# Patient Record
Sex: FEMALE | Race: WHITE | NOT HISPANIC OR LATINO | Employment: FULL TIME | URBAN - METROPOLITAN AREA
[De-identification: names, ages, dates, MRNs, and addresses within clinical notes are randomized per-mention and may not be internally consistent; named-entity substitution may affect disease eponyms.]

---

## 2018-08-13 ENCOUNTER — TRANSFERRED RECORDS (OUTPATIENT)
Dept: HEALTH INFORMATION MANAGEMENT | Facility: CLINIC | Age: 23
End: 2018-08-13

## 2018-11-26 ENCOUNTER — TELEPHONE (OUTPATIENT)
Dept: PLASTIC SURGERY | Facility: CLINIC | Age: 23
End: 2018-11-26

## 2018-11-26 NOTE — TELEPHONE ENCOUNTER
Attempt to call patient to remind him of his appt on 11/27/18. Phone is not accepting incoming calls. Unable to leave message.    Pj BREAUX LPN

## 2019-03-11 ENCOUNTER — PRE VISIT (OUTPATIENT)
Dept: PLASTIC SURGERY | Facility: CLINIC | Age: 24
End: 2019-03-11

## 2019-03-11 NOTE — TELEPHONE ENCOUNTER
FUTURE VISIT INFORMATION      FUTURE VISIT INFORMATION:    Date: 3/19/19    Time: 5:30pm    Location: [facility] [place]  REFERRAL INFORMATION:    Referring provider:  Dr. Sonia Bravo    Referring providers clinic:  [facility]    Reason for visit/diagnosis mastectomy, referral     RECORDS REQUESTED FROM:       Clinic name Comments Records Status Imaging Status   [facility] received- sent to scanning

## 2019-04-08 PROBLEM — Z79.899 CONTROLLED SUBSTANCE AGREEMENT SIGNED: Status: ACTIVE | Noted: 2017-05-11

## 2019-04-09 ENCOUNTER — OFFICE VISIT (OUTPATIENT)
Dept: PLASTIC SURGERY | Facility: CLINIC | Age: 24
End: 2019-04-09
Payer: COMMERCIAL

## 2019-04-09 VITALS — WEIGHT: 293 LBS | BODY MASS INDEX: 53.92 KG/M2 | HEIGHT: 62 IN

## 2019-04-09 DIAGNOSIS — F64.0 GENDER DYSPHORIA IN ADULT: Primary | ICD-10-CM

## 2019-04-09 ASSESSMENT — MIFFLIN-ST. JEOR: SCORE: 2040.9

## 2019-04-09 NOTE — PROGRESS NOTES
"REFERRING PROVIDER: Sonia Bravo    REASON FOR CONSULTATION: Gender dysphoria, requesting top surgery.    HPI: Patient is a 23-year-old trans-man who prefers he him pronouns, referred to me by Sonia Bravo for possible top surgery.  Patient reports that he has been considering undergoing top surgery for the past 11 years.  He has history of binding his chest daily for the past 1.5 years.  This has resulted in worsening of asthma and pain on top of his scoliosis.  By undergoing top surgery, patient would like to better align his physical body with his chosen gender identity.  He transitioned 1.5 years ago.  New name is Yemi.  He has been on hormone therapy for a year now.  He denies any previous breast history.    MEDS: Testosterone injections, sertraline, meloxicam for scoliosis, albuterol and Flovent inhalers.    ALLERGIES: None.    PMH: Depression, anxiety, scoliosis, and asthma.    PSH: None.    SH: Smokes an electronic cigarette. Has been doing so for 7 years. Works as a  and .    FH: Mother has Hashimoto's. Maternal grandmother had diabetes. Depression. Maternal grandfather and mother with colon cancer.    ROS: Denies chest pain, shortness of breath, MI, CVA, diabetes, DVT, PE, and bleeding disorders.    PHYSICAL EXAMINATION: Ht 1.575 m (5' 2\")   Wt 134.3 kg (296 lb)   BMI 54.14 kg/m    General: No acute distress.  Appears masculine.  Extremely obese body habitus.  Chest examination was performed in the presence of a chaperone.  This reveals bilateral grade 3 ptosis and an extremely obese body habitus.  There are multiple rolls along the anterior and lateral aspects of the trunk.  Pectoralis muscles intact bilaterally.  Thoracic laurels bilaterally are very large.  Notch to nipple distance is 28 cm on the right and 30 cm on the left.  Irregular diameter is 6 cm bilaterally.  Nipple to fold distance is 8 cm on the right and 6 cm on the left.  Base diameter is 18 cm on the right and 19 center " but there is no palpable breast mass.  There is no nipple discharge.  There is a tattoo on the left superior chest.    ASSESSMENT: Gender dysphoria, requesting top surgery.  Obese body habitus.    PLAN: I am recommending patient undergo weight management first.  I explained to him that the ideal BMI that we are looking for is under the range of 25-30.  He would like a weight management referral.  I have asked him to lose weight, get to his ideal body weight, and maintain it for 6 months prior to returning to see me.  If he has any questions about this he may return to see me at any time to meet and have further discussions.  At this time, given his high BMI, he is at significant increased risk of wound difficulties, contour deformities, asymmetry, and need for further revision surgeries.  All questions were answered    Total time spent with patient was 30 min of which greater than 50% was in counseling.

## 2019-04-09 NOTE — LETTER
"4/9/2019       RE: Lila Paniagua  [address] [address]     Dear Colleague,    Thank you for referring your patient, Lila Paniagua, to the [facility] PLASTIC AND RECONSTRUCTIVE SURGERY at [facility]. Please see a copy of my visit note below.    REFERRING PROVIDER: Sonia Bravo    REASON FOR CONSULTATION: Gender dysphoria, requesting top surgery.    HPI: Patient is a 23-year-old trans-man who prefers he him pronouns, referred to me by Sonia Bravo for possible top surgery.  Patient reports that he has been considering undergoing top surgery for the past 11 years.  He has history of binding his chest daily for the past 1.5 years.  This has resulted in worsening of asthma and pain on top of his scoliosis.  By undergoing top surgery, patient would like to better align his physical body with his chosen gender identity.  He transitioned 1.5 years ago.  New name is Yemi.  He has been on hormone therapy for a year now.  He denies any previous breast history.    MEDS: Testosterone injections, sertraline, meloxicam for scoliosis, albuterol and Flovent inhalers.    ALLERGIES: None.    PMH: Depression, anxiety, scoliosis, and asthma.    PSH: None.    SH: Smokes an electronic cigarette. Has been doing so for 7 years. Works as a  and .    FH: Mother has Hashimoto's. Maternal grandmother had diabetes. Depression. Maternal grandfather and mother with colon cancer.    ROS: Denies chest pain, shortness of breath, MI, CVA, diabetes, DVT, PE, and bleeding disorders.    PHYSICAL EXAMINATION: Ht 1.575 m (5' 2\")   Wt 134.3 kg (296 lb)   BMI 54.14 kg/m     General: No acute distress.  Appears masculine.  Extremely obese body habitus.  Chest examination was performed in the presence of a chaperone.  This reveals bilateral grade 3 ptosis and an extremely obese body habitus.  There are multiple rolls along the anterior and lateral aspects of the trunk.  " Pectoralis muscles intact bilaterally.  Thoracic laurels bilaterally are very large.  Notch to nipple distance is 28 cm on the right and 30 cm on the left.  Irregular diameter is 6 cm bilaterally.  Nipple to fold distance is 8 cm on the right and 6 cm on the left.  Base diameter is 18 cm on the right and 19 center but there is no palpable breast mass.  There is no nipple discharge.  There is a tattoo on the left superior chest.    ASSESSMENT: Gender dysphoria, requesting top surgery.  Obese body habitus.    PLAN: I am recommending patient undergo weight management first.  I explained to him that the ideal BMI that we are looking for is under the range of 25-30.  He would like a weight management referral.  I have asked him to lose weight, get to his ideal body weight, and maintain it for 6 months prior to returning to see me.  If he has any questions about this he may return to see me at any time to meet and have further discussions.  At this time, given his high BMI, he is at significant increased risk of wound difficulties, contour deformities, asymmetry, and need for further revision surgeries.  All questions were answered    Total time spent with patient was 30 min of which greater than 50% was in counseling.    Again, thank you for allowing me to participate in the care of your patient.      Sincerely,    Romulo Holt MD       none

## 2019-04-09 NOTE — PATIENT INSTRUCTIONS
You have been referred to the weight management team.      Please call our clinic for any questions,concerns,or worsening symptoms.      Clinic [phone]    Carline LIU RNCC  [phone]

## 2019-04-09 NOTE — NURSING NOTE
"No chief complaint on file.      Vitals:    04/09/19 1229   Weight: 134.3 kg (296 lb)   Height: 1.575 m (5' 2\")       Body mass index is 54.14 kg/m .      KARIN Calles NREMT                    No vitals taken per provider  "

## 2020-09-01 ENCOUNTER — TRANSCRIBE ORDERS (OUTPATIENT)
Dept: OTHER | Age: 25
End: 2020-09-01

## 2020-09-01 DIAGNOSIS — F64.9 GENDER DYSPHORIA: Primary | ICD-10-CM

## 2020-09-30 ENCOUNTER — OFFICE VISIT (OUTPATIENT)
Dept: OBGYN | Facility: CLINIC | Age: 25
End: 2020-09-30
Attending: OBSTETRICS & GYNECOLOGY
Payer: COMMERCIAL

## 2020-09-30 VITALS
BODY MASS INDEX: 53.92 KG/M2 | HEIGHT: 62 IN | HEART RATE: 122 BPM | SYSTOLIC BLOOD PRESSURE: 120 MMHG | WEIGHT: 293 LBS | DIASTOLIC BLOOD PRESSURE: 82 MMHG

## 2020-09-30 DIAGNOSIS — F64.9 GENDER DYSPHORIA: Primary | ICD-10-CM

## 2020-09-30 PROCEDURE — G0463 HOSPITAL OUTPT CLINIC VISIT: HCPCS | Mod: ZF

## 2020-09-30 RX ORDER — CEFAZOLIN SODIUM 1 G/3ML
1 INJECTION, POWDER, FOR SOLUTION INTRAMUSCULAR; INTRAVENOUS SEE ADMIN INSTRUCTIONS
Status: CANCELLED | OUTPATIENT
Start: 2020-09-30

## 2020-09-30 RX ORDER — ALBUTEROL SULFATE 90 UG/1
2 AEROSOL, METERED RESPIRATORY (INHALATION)
COMMUNITY
Start: 2020-08-18

## 2020-09-30 RX ORDER — METFORMIN HCL 500 MG
TABLET, EXTENDED RELEASE 24 HR ORAL
COMMUNITY
Start: 2020-05-01 | End: 2023-06-29

## 2020-09-30 RX ORDER — LORAZEPAM 0.5 MG/1
TABLET ORAL
COMMUNITY
Start: 2020-07-30 | End: 2023-06-29

## 2020-09-30 RX ORDER — TIZANIDINE 2 MG/1
TABLET ORAL
COMMUNITY
Start: 2020-05-01 | End: 2023-06-29

## 2020-09-30 RX ORDER — SERTRALINE HYDROCHLORIDE 100 MG/1
TABLET, FILM COATED ORAL
COMMUNITY
Start: 2020-05-21

## 2020-09-30 RX ORDER — MELOXICAM 7.5 MG/1
TABLET ORAL
COMMUNITY
Start: 2020-05-01 | End: 2023-06-29

## 2020-09-30 RX ORDER — CEFAZOLIN SODIUM IN 0.9 % NACL 3 G/100 ML
3 INTRAVENOUS SOLUTION, PIGGYBACK (ML) INTRAVENOUS
Status: CANCELLED | OUTPATIENT
Start: 2020-09-30

## 2020-09-30 ASSESSMENT — MIFFLIN-ST. JEOR: SCORE: 2137.95

## 2020-09-30 NOTE — PROGRESS NOTES
"[facility]  Gynecology Visit    CC: Gender dysphoria, requesting hysterectomy     HPI:    Yemi Paniagua is a 25 year old trans-male who prefers he/him pronouns, here to discuss hysterectomy. Patient says he has been thinking about getting hysterectomy since he first had menses, and more seriously for the past 12 years. Patient would like to better align his physical body with his gender identity. He transitioned >2 years ago. He has now been on hormone therapy for over 2 years as well. He was using nexplanon for contraception, had it removed one month ago. Not currently sexually active. Prior to that he was using OCPs for many years. He previously had irregular menses, says he has been diagnosed with PCOS. He has not had any bleeding since removal of nexplanon one month ago    Obstetrics History:  Never pregnant     Gynecologic History:  - LMP: No LMP recorded.  - Last Pap: NILM (8/31/20)  - Denies any history of abnormal pap smears  - Denies prior cervical surgery or procedures  - Denies any history of frequent UTIs, vaginal infections, or STIs  - Menses: as described above  - Sexual Activity: not currently     Past Medical History:  Asthma   Scoliosis   Morbid obesity   Prediabetes   Depression   Anxiety     Past Surgical History:  Denies    Current Medications:  Albuterol, testosterone, metformin, meloxicam, zoloft, zanaflex, ativan, MV     Allergies:  Flour; Lactose; Other (do not use); and Soap    Social History:   Works as    Single  E-cig use, going to try to decrease amount/stop  Denies alcohol or illicit drug use     Family History:  Mother: hashimoto's, colon cancer age 48. Denies any family history of gynecologic cancer or clotting disorders     ROS:  10-point ROS negative except as in HPI     Physical Exam  /82 (BP Location: Left arm, Patient Position: Chair)   Pulse 122   Ht 1.575 m (5' 2\")   Wt 145 kg (319 lb 9.6 oz)   BMI 58.46 kg/m    Gen: Well-appearing, NAD  HEENT: " Normocephalic, atraumatic  Neck: Thyroid is not enlarged, no appreciable masses palpated. Non-tender  CV:  Regular rate  Pulm: Non labored     I have reviewed labs and imaging    Imaging:   Pelvic US 9/1/20:   DATE: 09/01/20  [id number] US/PELVIC TV  INDICATION: Ovarian cyst.  COMPARISON: 08/30/2018.  FINDINGS: Uterus is normal in echotexture without leiomyoma. The uterus  measures 7.3 x 3.2 x 4.5 cm. The endometrium measures 6 mm. The right ovary is  not visualized. Normal blood flow to the left ovary. The left ovary measures 7.5  x 6.4 x 6.1 cm. No adnexal mass or free fluid. Simple left ovarian cyst measures  6.4 x 6.0 x 6.1 cm, similar to the prior study when it measured 8.0 x.4.9 x 5.6  cm.  IMPRESSION: Simple left ovarian cyst, similar in size and morphology compared  to the prior study measuring 6.4 x 6.0 x 6.1 cm.    Assessment/Plan  Yemi Paniagua is a 25 year old trans-male who prefers he/him pronouns, here to discuss hysterectomy.     # Gender affirming care  - Discussed various routes for hysterectomy. Patient is good candidate for robotic assisted laparoscopic hysterectomy.  Patient is amenable.  Discussed lifting restrictions and pelvic rest. Pt will need to take 4 weeks off from work followed by 2 weeks of light duty with lifting restriction.  - Discussed recommendation for concurrent bilateral salpingectomy for cancer risk reduction. Discussed option of bilateral oophorectomy, patient strongly desires removal of his ovaries. Understands he cannot have any biologic children once they are removed.  Discussed risks associated with surgery including risk of infection, blood loss, injury to surrounding structure, conversion to open procedure. All questions answered. Patient aware this often is same day procedure, will assess postoperatively   - Plan for: Exam under anesthesia, robot assisted total laparoscopic hysterectomy, and bilateral salpingo-oophorectomy, possible cystoscopy.    - Pre-op orders  placed  - Referred to PAC clinic for preoperative clearance  - Counseled the importance of stopping electronic cigarette use prior to surgery     Patient staffed with Dr. Montserrat Kahn MD  OB/GYN Resident, PGY-4  9/30/2020, 2:13 PM    The Patient was seen in Resident Continuity Clinic by GLORIA KAHN.  I reviewed the history & exam. Assessment and plan were jointly made.    Ivette Mariano MD

## 2020-10-01 ENCOUNTER — PATIENT OUTREACH (OUTPATIENT)
Dept: PLASTIC SURGERY | Facility: CLINIC | Age: 25
End: 2020-10-01

## 2020-10-01 NOTE — PROGRESS NOTES
10/7/20   Called pt back. Discussed needing 2 Letters. Pt reports his therapist recently moved and he needs to find a new therapist. Pt will sign up for [employer] and send writer a letter, then writer will provide therapist referrals who are performing virtual visits.     Pt understands he need to consider scheduling surgery far enough out to allow time to obtain 2 LOS.         10/1/20  Received message from Dr. Mariano to F/U regarding pts hysterectomy which will be coded under gender dysphoria. Pt previously saw Dr. Holt for top surgery but did not continue with care.     Writer LVM to call back and discuss care coordination to ensure hysterectomy is covered.    Herbie Braynt, SW  Transgender Care Coordinator

## 2020-10-02 ENCOUNTER — TELEPHONE (OUTPATIENT)
Dept: OBGYN | Facility: CLINIC | Age: 25
End: 2020-10-02

## 2020-10-09 ENCOUNTER — TELEPHONE (OUTPATIENT)
Dept: PSYCHOLOGY | Facility: CLINIC | Age: 25
End: 2020-10-09

## 2020-10-19 ENCOUNTER — HOSPITAL ENCOUNTER (OUTPATIENT)
Facility: CLINIC | Age: 25
End: 2020-10-19
Attending: OBSTETRICS & GYNECOLOGY | Admitting: OBSTETRICS & GYNECOLOGY
Payer: COMMERCIAL

## 2020-10-19 DIAGNOSIS — F64.9 GENDER DYSPHORIA: ICD-10-CM

## 2020-10-21 ENCOUNTER — TELEPHONE (OUTPATIENT)
Dept: OBGYN | Facility: CLINIC | Age: 25
End: 2020-10-21

## 2020-10-26 DIAGNOSIS — Z11.59 ENCOUNTER FOR SCREENING FOR OTHER VIRAL DISEASES: Primary | ICD-10-CM

## 2021-01-03 ENCOUNTER — HEALTH MAINTENANCE LETTER (OUTPATIENT)
Age: 26
End: 2021-01-03

## 2021-01-04 ENCOUNTER — PATIENT OUTREACH (OUTPATIENT)
Dept: PLASTIC SURGERY | Facility: CLINIC | Age: 26
End: 2021-01-04

## 2021-01-04 NOTE — PROGRESS NOTES
Called pt, LVM, to discuss Hyst denial due to exclusion criteria based on insurance plan.     Received denial paperwork from PA staff.    Asked pt to call or message if they wish to discuss this further. Previous message sent to pts OBGYN care team.     Herbie Bryant, [facility]  Transgender Care Coordinator

## 2021-01-08 ENCOUNTER — PATIENT OUTREACH (OUTPATIENT)
Dept: PLASTIC SURGERY | Facility: CLINIC | Age: 26
End: 2021-01-08

## 2021-01-08 NOTE — PROGRESS NOTES
Called pt & discussed cancelling hysterectomy surgery. Pt understood and agreed. Message sent to Dr. Ventura.     Pts insurance excluded gender affirming care, including hormones. However, pt is confused since insurance has been paying for hormones.     Pt would like to contact [facility] to engage legal services to help navigate medical policy exclusion. Writer sent pt ROIs to complete and send back.     Scanned denial paperwork in chart, emailed to writer by Emmanuelle Vásquez on 1/4/21.    Herbie Bryant, SW  Transgender Care Coordinator

## 2021-10-10 ENCOUNTER — HEALTH MAINTENANCE LETTER (OUTPATIENT)
Age: 26
End: 2021-10-10

## 2022-01-29 ENCOUNTER — HEALTH MAINTENANCE LETTER (OUTPATIENT)
Age: 27
End: 2022-01-29

## 2022-09-18 ENCOUNTER — HEALTH MAINTENANCE LETTER (OUTPATIENT)
Age: 27
End: 2022-09-18

## 2023-04-12 ENCOUNTER — TRANSFERRED RECORDS (OUTPATIENT)
Dept: HEALTH INFORMATION MANAGEMENT | Facility: CLINIC | Age: 28
End: 2023-04-12

## 2023-04-12 LAB
C TRACH DNA SPEC QL PROBE+SIG AMP: NOT DETECTED
HPV ABSTRACT: NORMAL
N GONORRHOEA DNA SPEC QL PROBE+SIG AMP: NOT DETECTED
PAP-ABSTRACT: NORMAL
SPECIMEN DESCRIP: NORMAL
SPECIMEN DESCRIPTION: NORMAL
TSH SERPL-ACNC: 2.2 UIU/ML (ref 0.27–4.2)

## 2023-04-14 ENCOUNTER — TRANSFERRED RECORDS (OUTPATIENT)
Dept: HEALTH INFORMATION MANAGEMENT | Facility: CLINIC | Age: 28
End: 2023-04-14

## 2023-05-07 ENCOUNTER — HEALTH MAINTENANCE LETTER (OUTPATIENT)
Age: 28
End: 2023-05-07

## 2023-05-16 ENCOUNTER — TRANSFERRED RECORDS (OUTPATIENT)
Dept: HEALTH INFORMATION MANAGEMENT | Facility: CLINIC | Age: 28
End: 2023-05-16

## 2023-05-17 ENCOUNTER — TRANSCRIBE ORDERS (OUTPATIENT)
Dept: OTHER | Age: 28
End: 2023-05-17

## 2023-05-17 DIAGNOSIS — N83.209 UNSPECIFIED OVARIAN CYST, UNSPECIFIED SIDE: Primary | ICD-10-CM

## 2023-05-17 DIAGNOSIS — N93.9 ABNORMAL UTERINE AND VAGINAL BLEEDING, UNSPECIFIED: ICD-10-CM

## 2023-06-29 ENCOUNTER — OFFICE VISIT (OUTPATIENT)
Dept: OBGYN | Facility: CLINIC | Age: 28
End: 2023-06-29
Attending: REGISTERED NURSE
Payer: COMMERCIAL

## 2023-06-29 VITALS
BODY MASS INDEX: 53.92 KG/M2 | SYSTOLIC BLOOD PRESSURE: 131 MMHG | DIASTOLIC BLOOD PRESSURE: 96 MMHG | HEIGHT: 62 IN | HEART RATE: 108 BPM | WEIGHT: 293 LBS

## 2023-06-29 DIAGNOSIS — R93.89 ENDOMETRIAL STRIPE INCREASED: ICD-10-CM

## 2023-06-29 DIAGNOSIS — N83.209 CYST OF OVARY, UNSPECIFIED LATERALITY: ICD-10-CM

## 2023-06-29 DIAGNOSIS — F64.0 GENDER DYSPHORIA IN ADULT: ICD-10-CM

## 2023-06-29 DIAGNOSIS — N93.9 ABNORMAL UTERINE AND VAGINAL BLEEDING, UNSPECIFIED: Primary | ICD-10-CM

## 2023-06-29 PROCEDURE — 99214 OFFICE O/P EST MOD 30 MIN: CPT | Mod: GC | Performed by: OBSTETRICS & GYNECOLOGY

## 2023-06-29 PROCEDURE — G0463 HOSPITAL OUTPT CLINIC VISIT: HCPCS | Performed by: OBSTETRICS & GYNECOLOGY

## 2023-06-29 ASSESSMENT — ENCOUNTER SYMPTOMS
HOT FLASHES: 0
DECREASED LIBIDO: 0

## 2023-06-29 NOTE — LETTER
6/29/2023       RE: Lila Paniagua  [address] [address]     Dear Colleague,    Thank you for referring your patient, Lila Paniagua, to the [facility] [place] at [facility]. Please see a copy of my visit note below.    [facility]  Gynecology Visit    Reason for Visit:     HPI:    Yemi Paniagua is a 27 year transgender male presenting for AUB and hysterectomy counseling. His main concern is ongoing intermittent uterine bleeding, primarily passing ~quarter sized clots 1-2x per week. These episodes are distinct from his menses, which he reports are typically more of a mild to moderate trickle without monique clots. Notes that he underwent TVUS last month which revealed a thickened endometrial lining and interval growth of known L-sided simple ovarian cyst (unable to view report in Epic). He subsequently underwent attempted endometrial biopsy in clinic, however, was unsuccessful. Referred to [facility] for possible EUA and hysteroscopy.     Yemi also wishes to proceed with hysterectomy and BSO for gender-affirming care. Previously had surgery scheduled for 12/2020 but was cancelled d/t PA coverage denial. No longer has Nexplanon (s/p removal 01/2020) and has been off of T x1 year iso changed insurance coverage, wishes to restart following hysterectomy.     GYN History  LMP: 06/5/2023  Menses: q28-32 days, 4-5 days of moderate bleeding (changes pads ~4x/day)   Sexually active: Not sexually active x5 years  History of STIs: Denies   Pap Smears: 04/12/2023 NIL, HPV neg   History of abnormal paps: None   Contraception: S/p Nexplanon removal 2020    OBHx  OB History   No obstetric history on file.       No past medical history on file.    No past surgical history on file.      Current Outpatient Medications:     albuterol (PROAIR HFA/PROVENTIL HFA/VENTOLIN HFA) 108 (90 Base) MCG/ACT inhaler, Inhale 2  "puffs into the lungs, Disp: , Rfl:     sertraline (ZOLOFT) 100 MG tablet, Daily (Patient not taking: Reported on 6/29/2023), Disp: , Rfl:     Testosterone Cypionate 100 MG/ML SOLN, INJECT 0.3ML (60MG) SUBCUTANEOUS EVERY OTHER WEEK. (Patient not taking: Reported on 6/29/2023), Disp: , Rfl:     Allergies   Allergen Reactions    Flour Nausea and Vomiting and Unknown     Tightness in sternum area      Lactose Nausea and Vomiting and Unknown     Gas and bloating      Other (Do Not Use) Swelling and Unknown    Soap Hives and Itching     Dial soap         No family history on file.    Social History     Socioeconomic History    Marital status: Single     Spouse name: Not on file    Number of children: Not on file    Years of education: Not on file    Highest education level: Not on file   Occupational History    Not on file   Tobacco Use    Smoking status: Not on file    Smokeless tobacco: Not on file   Substance and Sexual Activity    Alcohol use: Not on file    Drug use: Not on file    Sexual activity: Not on file   Other Topics Concern    Not on file   Social History Narrative    Not on file     Social Determinants of Health     Financial Resource Strain: Not on file   Food Insecurity: Not on file   Transportation Needs: Not on file   Physical Activity: Not on file   Stress: Not on file   Social Connections: Not on file   Intimate Partner Violence: Not on file   Housing Stability: Not on file       ROS: 10-Point ROS negative except as noted in HPI     Physical Exam  BP (!) 131/96   Pulse 108   Ht 1.575 m (5' 2\")   Wt 149.5 kg (329 lb 8 oz)   LMP 06/05/2023   BMI 60.27 kg/m    Gen: Well-appearing, NAD  Pulm:  Breathing non-labored   Abd: Soft, non-tender, non-distended  Ext: No LE edema, extremities warm and well perfused    Assessment/Plan:  Yemi Paniagua is a 27 year transgender male presenting for AUB iso thickened endometrial stripe (unable to verify imaging) and hysterectomy scheduling.     Plan:  - Pt will " contact [place] to fax TVUS reports to [facility]    - Plan for EUA + EMBx prior to hysterectomy for AUB evaluation-will discuss placement of Mirena IUD the day of surgery for ongoing cycle control   - Following hysteroscopy/endometrial sampling, needs a visit with one of the [facility] surgeons who do robotic assisted hysterectomy for an evaluation and discussion of the feasibility and safety of surgery given BMI 60 with largely central distribution  - Referral place to [facility] for care/letter coordination    Staffed with Dr. Lucho Oro MD   Obstetrics & Gynecology, PGY-1  06/29/2023 12:57 PM       Bertha Yepez MD

## 2023-06-29 NOTE — NURSING NOTE
Chief Complaint   Patient presents with     Consult     Referral for ovarian cyst removal, possible hysterectomy

## 2023-06-29 NOTE — PROGRESS NOTES
[facility]  Gynecology Visit    Reason for Visit:     HPI:    Yemi Paniagua is a 27 year transgender male presenting for AUB and hysterectomy counseling. His main concern is ongoing intermittent uterine bleeding, primarily passing ~quarter sized clots 1-2x per week. These episodes are distinct from his menses, which he reports are typically more of a mild to moderate trickle without monique clots. Notes that he underwent TVUS last month which revealed a thickened endometrial lining and interval growth of known L-sided simple ovarian cyst (unable to view report in Epic). He subsequently underwent attempted endometrial biopsy in clinic, however, was unsuccessful. Referred to [facility] for possible EUA and hysteroscopy.     Yemi also wishes to proceed with hysterectomy and BSO for gender-affirming care. Previously had surgery scheduled for 12/2020 but was cancelled d/t PA coverage denial. No longer has Nexplanon (s/p removal 01/2020) and has been off of T x1 year iso changed insurance coverage, wishes to restart following hysterectomy.     GYN History  LMP: 06/5/2023  Menses: q28-32 days, 4-5 days of moderate bleeding (changes pads ~4x/day)   Sexually active: Not sexually active x5 years  History of STIs: Denies   Pap Smears: 04/12/2023 NIL, HPV neg   History of abnormal paps: None   Contraception: S/p Nexplanon removal 2020    OBHx  OB History   No obstetric history on file.       No past medical history on file.    No past surgical history on file.      Current Outpatient Medications:      albuterol (PROAIR HFA/PROVENTIL HFA/VENTOLIN HFA) 108 (90 Base) MCG/ACT inhaler, Inhale 2 puffs into the lungs, Disp: , Rfl:      sertraline (ZOLOFT) 100 MG tablet, Daily (Patient not taking: Reported on 6/29/2023), Disp: , Rfl:      Testosterone Cypionate 100 MG/ML SOLN, INJECT 0.3ML (60MG) SUBCUTANEOUS EVERY OTHER WEEK. (Patient not taking: Reported on 6/29/2023), Disp: , Rfl:     Allergies   Allergen Reactions     Flour Nausea  "and Vomiting and Unknown     Tightness in sternum area       Lactose Nausea and Vomiting and Unknown     Gas and bloating       Other (Do Not Use) Swelling and Unknown     Soap Hives and Itching     Dial soap         No family history on file.    Social History     Socioeconomic History     Marital status: Single     Spouse name: Not on file     Number of children: Not on file     Years of education: Not on file     Highest education level: Not on file   Occupational History     Not on file   Tobacco Use     Smoking status: Not on file     Smokeless tobacco: Not on file   Substance and Sexual Activity     Alcohol use: Not on file     Drug use: Not on file     Sexual activity: Not on file   Other Topics Concern     Not on file   Social History Narrative     Not on file     Social Determinants of Health     Financial Resource Strain: Not on file   Food Insecurity: Not on file   Transportation Needs: Not on file   Physical Activity: Not on file   Stress: Not on file   Social Connections: Not on file   Intimate Partner Violence: Not on file   Housing Stability: Not on file       ROS: 10-Point ROS negative except as noted in HPI     Physical Exam  BP (!) 131/96   Pulse 108   Ht 1.575 m (5' 2\")   Wt 149.5 kg (329 lb 8 oz)   LMP 06/05/2023   BMI 60.27 kg/m    Gen: Well-appearing, NAD  Pulm:  Breathing non-labored   Abd: Soft, non-tender, non-distended  Ext: No LE edema, extremities warm and well perfused    Assessment/Plan:  Yemi Paniagua is a 27 year transgender male presenting for AUB iso thickened endometrial stripe (unable to verify imaging) and hysterectomy scheduling.     Plan:  - Pt will contact [place] to fax TVUS reports to [facility]    - Plan for EUA + EMBx prior to hysterectomy for AUB evaluation-will discuss placement of Mirena IUD the day of surgery for ongoing cycle control   - Following hysteroscopy/endometrial sampling, needs a visit with one of the [facility] surgeons who do robotic assisted hysterectomy " for an evaluation and discussion of the feasibility and safety of surgery given BMI 60 with largely central distribution  - Referral place to [facility] for care/letter coordination    Staffed with Dr. Lucho Oro MD   Obstetrics & Gynecology, PGY-1  06/29/2023 12:57 PM     The patient was seen and examined with Dr. Oro.  I have reviewed and edited the above note.     Bertha Yepez MD, FACOG

## 2023-07-11 ENCOUNTER — PREP FOR PROCEDURE (OUTPATIENT)
Dept: OBGYN | Facility: CLINIC | Age: 28
End: 2023-07-11

## 2023-07-11 DIAGNOSIS — N93.9 ABNORMAL UTERINE BLEEDING (AUB): Primary | ICD-10-CM

## 2023-07-11 DIAGNOSIS — R93.89 ENDOMETRIAL THICKENING ON ULTRASOUND: ICD-10-CM

## 2023-07-11 RX ORDER — ACETAMINOPHEN 325 MG/1
975 TABLET ORAL ONCE
Status: CANCELLED | OUTPATIENT
Start: 2023-07-11 | End: 2023-07-11

## 2023-07-12 ENCOUNTER — TELEPHONE (OUTPATIENT)
Dept: OBGYN | Facility: CLINIC | Age: 28
End: 2023-07-12

## 2023-07-12 NOTE — TELEPHONE ENCOUNTER
Talked with patient to discuss surgery case request received and to ask for preferences on dates and providers. Patient is open to any provider performing surgery and hopes for sometime early October. Surgery scheduler will work on finding a few potential dates and reach out to patient as soon as possible to schedule

## 2023-07-14 ENCOUNTER — HOSPITAL ENCOUNTER (OUTPATIENT)
Facility: AMBULATORY SURGERY CENTER | Age: 28
End: 2023-07-14
Attending: STUDENT IN AN ORGANIZED HEALTH CARE EDUCATION/TRAINING PROGRAM
Payer: COMMERCIAL

## 2023-07-14 PROBLEM — R93.89 ENDOMETRIAL THICKENING ON ULTRASOUND: Status: ACTIVE | Noted: 2023-07-11

## 2023-07-14 PROBLEM — N93.9 ABNORMAL UTERINE BLEEDING (AUB): Status: ACTIVE | Noted: 2023-07-11

## 2023-07-14 NOTE — TELEPHONE ENCOUNTER
Spoke with patient, scheduled surgery. Patient is aware of date, time, location, prep instructions, need for H&P within 30 days.    Type of surgery: pelvic exam under anesthesia, HYSTEROSCOPY, WITH DILATION AND CURETTAGE OF UTERUS USING MORCELLATOR, possible Mirena IUD placement     [facility] of surgery: [facility]  Date and time of surgery: 10/11/23 at 0715  Surgeon: Dr Maurer  Pre-Op H&P Date: patient will call to schedule, aware of need to complete within 30 days of surgery  Post-Op Appt Date: patient will call to schedule   Packet sent out: Yes

## 2023-09-06 ENCOUNTER — HOSPITAL ENCOUNTER (OUTPATIENT)
Facility: CLINIC | Age: 28
End: 2023-09-06
Attending: STUDENT IN AN ORGANIZED HEALTH CARE EDUCATION/TRAINING PROGRAM | Admitting: STUDENT IN AN ORGANIZED HEALTH CARE EDUCATION/TRAINING PROGRAM
Payer: COMMERCIAL

## 2023-10-09 NOTE — OR NURSING
No H&P in chart for surgery on 10/11/23  Received: Today  Naomy Quispe, RN  P s Surgery Scheduling Pool-[facility]  Cc: P s Rn-[facility]   Hello,    Pt is scheduled for PELVIC EXAM UNDER ANESTHESIA, HYSTEROSCOPY, WITH DILATION AND CURETTAGE OF UTERUS USING MORCELLATOR, and POSSIBLE MIRENA IUD PLACEMENT on 10/11/23.    There is no pre-op H&P in the chart. We have left pt 2 voicemail messages to call call our office for  the pre-op call but have not heard back.    If we do reach pt and he confirms he will come for surgery but does not have or can't get an H&P before surgery, will the surgeon do an H&P on DOS?    Kind regards,    Naomy Quispe RN, BSN  Pre-Anesthesia screening  [phone] Office    PAS Notification: Your patient is scheduled for surgery in 2 days. Critical chart components are missing. If the required components are not completed in EPIC by noon the day prior to surgery your case may be rescheduled. Thank you in advance for completing the record and improving [facility]'s quality of care.    Surgeon's Name: Hayley Maurer MD    Missing Components:H&P within 30 days. Also, we have not been able to reach pt yet for pre-op call.

## 2023-10-10 ENCOUNTER — TELEPHONE (OUTPATIENT)
Dept: OBGYN | Facility: CLINIC | Age: 28
End: 2023-10-10
Payer: COMMERCIAL

## 2024-02-26 ENCOUNTER — NURSE TRIAGE (OUTPATIENT)
Dept: NURSING | Facility: CLINIC | Age: 29
End: 2024-02-26

## 2024-02-26 NOTE — TELEPHONE ENCOUNTER
"Nurse Triage SBAR    Is this a 2nd Level Triage? NO    Situation:   Patient reporting waking in past 20 minutes with heavy vaginal bleeding.    Background:     PCOS    Assessment:   Patient reporting menstrual cycle starting yesterday 2/25/24.   Reports history of regular menstrual cycles.  Patient reports sudden onset of vaginal bleeding \"gush\" of blood, in past 20 minutes passing \"clots the size of 2 quarters together, looks like raw chicken.\" Reports passing around 5 clots followed by heavy bleeding.  Difficulty controlling bleeding with pad.  Patient denies being sexually active in past several years.  Reports history of heavy menstrual cycles with PCOS.    Protocol Recommended Disposition:   See in ED now. Patient agrees to have someone drive them  to the ED now, stating they are very close to the ED.  Reviewed if unable to stand or feels faint to call 911 for transport.      Reason for Disposition   SEVERE vaginal bleeding (e.g., soaking 2 pads or tampons per hour and present 2 or more hours; 1 menstrual cup every 2 hours)    Additional Information   Negative: Shock suspected (e.g., cold/pale/clammy skin, too weak to stand, low BP, rapid pulse)   Negative: Difficult to awaken or acting confused (e.g., disoriented, slurred speech)   Negative: Passed out (i.e., lost consciousness, collapsed and was not responding)   Negative: Sounds like a life-threatening emergency to the triager   Negative: SEVERE abdominal pain   Negative: SEVERE dizziness (e.g., unable to stand, requires support to walk, feels like passing out now)    Protocols used: Vaginal Bleeding - [id number]    "

## 2024-07-14 ENCOUNTER — HEALTH MAINTENANCE LETTER (OUTPATIENT)
Age: 29
End: 2024-07-14

## 2024-08-12 ENCOUNTER — NURSE TRIAGE (OUTPATIENT)
Dept: NURSING | Facility: CLINIC | Age: 29
End: 2024-08-12

## 2024-08-12 NOTE — TELEPHONE ENCOUNTER
"Nurse Triage SBAR    Is this a 2nd Level Triage? NO    Situation: Severe Abd. And lower back pain    Background:   -Concerning symptoms, No bowel movement since Last Thursday, 8/8    Assessment:   -No bowel movement since Last Th, 8/8 x4 days  -Normal: 1-2 a day  -This am Nauseous  -Temperature, now 99.6 (oral), Tmax 100.2  -Lower back pain 7/10. Lower Abd. Pain 8-9/10    Protocol Recommended Disposition:   Go to ED Now, See More Appropriate Guideline    Recommendation: Go to [facility],    Care advice reviewed. Pt. Verbalized understanding and agreed to follow care advice given. Advised to call back with any new signs or symptoms or concerns, pt. Agreed.               Reason for Disposition   [1] Abdomen pain is main symptom AND [2] adult female   [1] SEVERE pain (e.g., excruciating) AND [2] present > 1 hour    Additional Information   Negative: Shock suspected (e.g., cold/pale/clammy skin, too weak to stand, low BP, rapid pulse)   Negative: Difficult to awaken or acting confused (e.g., disoriented, slurred speech)   Negative: Passed out (i.e., lost consciousness, collapsed and was not responding)   Negative: Sounds like a life-threatening emergency to the triager   Negative: Followed an abdomen (stomach) injury   Negative: Chest pain   Negative: [1] Abdominal pain AND [2] pregnant < 20 weeks   Negative: [1] Abdominal pain AND [2] pregnant 20 or more weeks   Negative: [1] Abdominal pain AND [2] postpartum (from 0 to 6 weeks after delivery)   Negative: Pain is mainly in upper abdomen  (if needed ask: \"is it mainly above the belly button?\")   Negative: Abdomen bloating or swelling are main symptoms    Protocols used: Constipation-A-AH, Abdominal Pain - Female-A-AH  Evangelina Quispe RN, Triage Nurse Advisor, 8/12/2024 6:53 PM  "

## 2024-08-14 ENCOUNTER — NURSE TRIAGE (OUTPATIENT)
Dept: NURSING | Facility: CLINIC | Age: 29
End: 2024-08-14

## 2024-08-14 NOTE — TELEPHONE ENCOUNTER
Seen at [facility] on 8/12 and was prescribed Cipro 500 mg BID x 7 days for double kidney infection. Pt states he received an IV dose of antibiotics on 8/12, and had 2 doses of Cipro.    Back pain reduced since starting antibiotics.  Fever returned at 1 AM, T 100.2F    No vomiting  No hematuria  Still urinating    PLAN: Be seen within 24 hours. Reach out to PCP in AM to get a same day visit. Continue Cipro, increase fluid intake.    Call back for further concerns.    Pt verbalized understanding.    Aicha Campos RN/[place] Nurse Advisor        Reason for Disposition   [1] Urinary tract  infection (e.g., cystitis, pyelonephritis, urethritis) AND [2] female AND [3] taking an antibiotic   [1] Taking antibiotic > 24 hours for UTI (urinary tract or bladder infection) AND [2] fever persists (still has fever)    Additional Information   Negative: Shock suspected (e.g., cold/pale/clammy skin, too weak to stand, low BP, rapid pulse)   Negative: Sounds like a life-threatening emergency to the triager   Negative: Urinary tract infection suspected, but not taking antibiotics   Negative: [1] Unable to urinate (or only a few drops) > 4 hours AND [2] bladder feels very full (e.g., palpable bladder or strong urge to urinate)   Negative: Passing pure blood or large blood clots (i.e., size > a dime)  (Exceptions: Flecks, small strands, or pinkish-red color)   Negative: Fever > 103 F (39.4 C)   Negative: Patient sounds very sick or weak to the triager   Negative: [1] SEVERE pain (e.g., excruciating) AND [2] no improvement 2 hours after pain medications   Negative: [1] Fever > 100.0 F (37.8 C) AND [2] new-onset since starting antibiotics   Negative: [1] Side (flank) or lower back pain AND [2] new-onset since starting antibiotics   Negative: [1] Taking antibiotic > 24 hours for UTI AND [2] flank or lower back pain getting WORSE   Negative: [1] Vomiting 2 or more times AND [2] interferes with taking oral antibiotic    Protocols  used: Infection on Antibiotic Follow-up Call-A-, Urinary Tract Infection on Antibiotic Follow-up Call - Female-A-

## 2025-01-31 ENCOUNTER — TRANSFERRED RECORDS (OUTPATIENT)
Dept: HEALTH INFORMATION MANAGEMENT | Facility: CLINIC | Age: 30
End: 2025-01-31
Payer: COMMERCIAL

## 2025-01-31 ENCOUNTER — MEDICAL CORRESPONDENCE (OUTPATIENT)
Dept: HEALTH INFORMATION MANAGEMENT | Facility: CLINIC | Age: 30
End: 2025-01-31
Payer: COMMERCIAL

## 2025-01-31 LAB — PHQ9 SCORE: 7

## 2025-02-04 ENCOUNTER — TRANSCRIBE ORDERS (OUTPATIENT)
Dept: OTHER | Age: 30
End: 2025-02-04

## 2025-02-04 DIAGNOSIS — N83.209 UNSPECIFIED OVARIAN CYST, UNSPECIFIED SIDE: Primary | ICD-10-CM

## 2025-07-10 ENCOUNTER — HOSPITAL ENCOUNTER (EMERGENCY)
Age: 30
Discharge: HOME | End: 2025-07-10
Payer: COMMERCIAL

## 2025-07-10 VITALS
DIASTOLIC BLOOD PRESSURE: 104 MMHG | TEMPERATURE: 97.7 F | RESPIRATION RATE: 16 BRPM | HEART RATE: 98 BPM | SYSTOLIC BLOOD PRESSURE: 151 MMHG | OXYGEN SATURATION: 98 %

## 2025-07-10 VITALS — BODY MASS INDEX: 62.9 KG/M2

## 2025-07-10 DIAGNOSIS — Z79.899: ICD-10-CM

## 2025-07-10 DIAGNOSIS — H66.91: Primary | ICD-10-CM

## 2025-07-10 PROCEDURE — 99284 EMERGENCY DEPT VISIT MOD MDM: CPT

## 2025-07-10 PROCEDURE — 99283 EMERGENCY DEPT VISIT LOW MDM: CPT

## 2025-07-10 PROCEDURE — 96372 THER/PROPH/DIAG INJ SC/IM: CPT

## 2025-07-19 ENCOUNTER — HEALTH MAINTENANCE LETTER (OUTPATIENT)
Age: 30
End: 2025-07-19